# Patient Record
Sex: FEMALE | Race: BLACK OR AFRICAN AMERICAN | Employment: OTHER | URBAN - METROPOLITAN AREA
[De-identification: names, ages, dates, MRNs, and addresses within clinical notes are randomized per-mention and may not be internally consistent; named-entity substitution may affect disease eponyms.]

---

## 2023-08-12 ENCOUNTER — APPOINTMENT (OUTPATIENT)
Dept: CT IMAGING | Age: 78
DRG: 563 | End: 2023-08-12
Payer: MEDICARE

## 2023-08-12 ENCOUNTER — APPOINTMENT (OUTPATIENT)
Dept: GENERAL RADIOLOGY | Age: 78
DRG: 563 | End: 2023-08-12
Payer: MEDICARE

## 2023-08-12 ENCOUNTER — HOSPITAL ENCOUNTER (INPATIENT)
Age: 78
LOS: 2 days | Discharge: NURSING, REHAB OR LONG-TERM CARE | DRG: 563 | End: 2023-08-14
Attending: EMERGENCY MEDICINE | Admitting: INTERNAL MEDICINE
Payer: MEDICARE
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
## 2023-08-12 DIAGNOSIS — S32.591A CLOSED FRACTURE OF MULTIPLE RAMI OF RIGHT PUBIS, INITIAL ENCOUNTER (HCC): ICD-10-CM

## 2023-08-12 DIAGNOSIS — S62.101A RIGHT WRIST FRACTURE, CLOSED, INITIAL ENCOUNTER: ICD-10-CM
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
## 2023-08-12 PROBLEM — D64.9 NORMOCYTIC ANEMIA: Status: ACTIVE | Noted: 2023-08-12

## 2023-08-12 PROBLEM — G40.909 SEIZURE DISORDER (HCC): Status: ACTIVE | Noted: 2023-08-12

## 2023-08-12 PROBLEM — S52.551A CLOSED EXTRAARTICULAR FRACTURE OF DISTAL RADIUS, RIGHT, INITIAL ENCOUNTER: Status: ACTIVE | Noted: 2023-08-12

## 2023-08-12 LAB
ANION GAP SERPL CALCULATED.3IONS-SCNC: 15 MMOL/L (ref 3–16)
BASOPHILS # BLD: 0.1 K/UL (ref 0–0.2)
BASOPHILS NFR BLD: 0.7 %
BUN SERPL-MCNC: 18 MG/DL (ref 7–20)
CALCIUM SERPL-MCNC: 9.3 MG/DL (ref 8.3–10.6)
CHLORIDE SERPL-SCNC: 98 MMOL/L (ref 99–110)
CO2 SERPL-SCNC: 23 MMOL/L (ref 21–32)
CREAT SERPL-MCNC: 0.7 MG/DL (ref 0.6–1.2)
DEPRECATED RDW RBC AUTO: 15.1 % (ref 12.4–15.4)
EOSINOPHIL # BLD: 0 K/UL (ref 0–0.6)
EOSINOPHIL NFR BLD: 0.2 %
GFR SERPLBLD CREATININE-BSD FMLA CKD-EPI: >60 ML/MIN/{1.73_M2}
GLUCOSE SERPL-MCNC: 74 MG/DL (ref 70–99)
HCT VFR BLD AUTO: 33.5 % (ref 36–48)
HGB BLD-MCNC: 11.1 G/DL (ref 12–16)
LYMPHOCYTES # BLD: 1.4 K/UL (ref 1–5.1)
LYMPHOCYTES NFR BLD: 12.6 %
MCH RBC QN AUTO: 26.4 PG (ref 26–34)
MCHC RBC AUTO-ENTMCNC: 33.3 G/DL (ref 31–36)
MCV RBC AUTO: 79.2 FL (ref 80–100)
MONOCYTES # BLD: 0.6 K/UL (ref 0–1.3)
MONOCYTES NFR BLD: 5.4 %
NEUTROPHILS # BLD: 8.8 K/UL (ref 1.7–7.7)
NEUTROPHILS NFR BLD: 81.1 %
PLATELET # BLD AUTO: 191 K/UL (ref 135–450)
PMV BLD AUTO: 10.2 FL (ref 5–10.5)
POTASSIUM SERPL-SCNC: 4.4 MMOL/L (ref 3.5–5.1)
RBC # BLD AUTO: 4.23 M/UL (ref 4–5.2)
SODIUM SERPL-SCNC: 136 MMOL/L (ref 136–145)
WBC # BLD AUTO: 10.8 K/UL (ref 4–11)

## 2023-08-12 PROCEDURE — 80048 BASIC METABOLIC PNL TOTAL CA: CPT

## 2023-08-12 PROCEDURE — 6370000000 HC RX 637 (ALT 250 FOR IP): Performed by: INTERNAL MEDICINE

## 2023-08-12 PROCEDURE — 85025 COMPLETE CBC W/AUTO DIFF WBC: CPT

## 2023-08-12 PROCEDURE — 96374 THER/PROPH/DIAG INJ IV PUSH: CPT

## 2023-08-12 PROCEDURE — 1200000000 HC SEMI PRIVATE

## 2023-08-12 PROCEDURE — 73502 X-RAY EXAM HIP UNI 2-3 VIEWS: CPT

## 2023-08-12 PROCEDURE — 6360000002 HC RX W HCPCS: Performed by: PHYSICIAN ASSISTANT

## 2023-08-12 PROCEDURE — 2580000003 HC RX 258: Performed by: INTERNAL MEDICINE

## 2023-08-12 PROCEDURE — 73110 X-RAY EXAM OF WRIST: CPT

## 2023-08-12 PROCEDURE — 96375 TX/PRO/DX INJ NEW DRUG ADDON: CPT

## 2023-08-12 PROCEDURE — 72192 CT PELVIS W/O DYE: CPT

## 2023-08-12 PROCEDURE — 6370000000 HC RX 637 (ALT 250 FOR IP): Performed by: PHYSICIAN ASSISTANT

## 2023-08-12 PROCEDURE — 99285 EMERGENCY DEPT VISIT HI MDM: CPT

## 2023-08-12 RX ORDER — SODIUM CHLORIDE 0.9 % (FLUSH) 0.9 %
5-40 SYRINGE (ML) INJECTION EVERY 12 HOURS SCHEDULED
Status: DISCONTINUED | OUTPATIENT
Start: 2023-08-12 | End: 2023-08-14 | Stop reason: HOSPADM

## 2023-08-12 RX ORDER — MORPHINE SULFATE 4 MG/ML
4 INJECTION INTRAVENOUS ONCE
Status: COMPLETED | OUTPATIENT
Start: 2023-08-12 | End: 2023-08-12

## 2023-08-12 RX ORDER — ACETAMINOPHEN 325 MG/1
650 TABLET ORAL EVERY 6 HOURS PRN
Status: DISCONTINUED | OUTPATIENT
Start: 2023-08-12 | End: 2023-08-14 | Stop reason: HOSPADM

## 2023-08-12 RX ORDER — ACETAMINOPHEN 650 MG/1
650 SUPPOSITORY RECTAL EVERY 6 HOURS PRN
Status: DISCONTINUED | OUTPATIENT
Start: 2023-08-12 | End: 2023-08-14 | Stop reason: HOSPADM

## 2023-08-12 RX ORDER — ONDANSETRON 2 MG/ML
4 INJECTION INTRAMUSCULAR; INTRAVENOUS ONCE
Status: COMPLETED | OUTPATIENT
Start: 2023-08-12 | End: 2023-08-12

## 2023-08-12 RX ORDER — TORSEMIDE 20 MG/1
20 TABLET ORAL DAILY
COMMUNITY
Start: 2023-08-02

## 2023-08-12 RX ORDER — CHOLECALCIFEROL (VITAMIN D3) 1250 MCG
1 CAPSULE ORAL
COMMUNITY

## 2023-08-12 RX ORDER — ACETAMINOPHEN 500 MG
500 TABLET ORAL EVERY 6 HOURS PRN
COMMUNITY

## 2023-08-12 RX ORDER — PRIMIDONE 250 MG/1
250 TABLET ORAL DAILY
COMMUNITY
Start: 2023-07-19

## 2023-08-12 RX ORDER — CALCIUM CARBONATE 500 MG/1
1 TABLET, CHEWABLE ORAL DAILY
COMMUNITY

## 2023-08-12 RX ORDER — SODIUM CHLORIDE 9 MG/ML
INJECTION, SOLUTION INTRAVENOUS PRN
Status: DISCONTINUED | OUTPATIENT
Start: 2023-08-12 | End: 2023-08-14 | Stop reason: HOSPADM

## 2023-08-12 RX ORDER — ONDANSETRON 2 MG/ML
4 INJECTION INTRAMUSCULAR; INTRAVENOUS EVERY 6 HOURS PRN
Status: DISCONTINUED | OUTPATIENT
Start: 2023-08-12 | End: 2023-08-14 | Stop reason: HOSPADM

## 2023-08-12 RX ORDER — FERROUS SULFATE 325(65) MG
325 TABLET ORAL
COMMUNITY

## 2023-08-12 RX ORDER — OXYCODONE HYDROCHLORIDE 5 MG/1
5 TABLET ORAL ONCE
Status: COMPLETED | OUTPATIENT
Start: 2023-08-12 | End: 2023-08-12

## 2023-08-12 RX ORDER — MAGNESIUM HYDROXIDE/ALUMINUM HYDROXICE/SIMETHICONE 120; 1200; 1200 MG/30ML; MG/30ML; MG/30ML
30 SUSPENSION ORAL EVERY 6 HOURS PRN
Status: DISCONTINUED | OUTPATIENT
Start: 2023-08-12 | End: 2023-08-14 | Stop reason: HOSPADM

## 2023-08-12 RX ORDER — ONDANSETRON 4 MG/1
4 TABLET, ORALLY DISINTEGRATING ORAL EVERY 8 HOURS PRN
Status: DISCONTINUED | OUTPATIENT
Start: 2023-08-12 | End: 2023-08-14 | Stop reason: HOSPADM

## 2023-08-12 RX ORDER — ENOXAPARIN SODIUM 100 MG/ML
40 INJECTION SUBCUTANEOUS DAILY
Status: DISCONTINUED | OUTPATIENT
Start: 2023-08-13 | End: 2023-08-14 | Stop reason: HOSPADM

## 2023-08-12 RX ORDER — POLYETHYLENE GLYCOL 3350 17 G/17G
17 POWDER, FOR SOLUTION ORAL DAILY PRN
Status: DISCONTINUED | OUTPATIENT
Start: 2023-08-12 | End: 2023-08-14 | Stop reason: HOSPADM

## 2023-08-12 RX ORDER — SODIUM CHLORIDE 0.9 % (FLUSH) 0.9 %
5-40 SYRINGE (ML) INJECTION PRN
Status: DISCONTINUED | OUTPATIENT
Start: 2023-08-12 | End: 2023-08-14 | Stop reason: HOSPADM

## 2023-08-12 RX ORDER — CAMPHOR, MENTHOL, METHYL SALICYLATE 21.56; 41.73; 69.55 MG/1; MG/1; MG/1
PATCH PERCUTANEOUS; TOPICAL; TRANSDERMAL 2 TIMES DAILY
COMMUNITY

## 2023-08-12 RX ORDER — CARBAMAZEPINE 200 MG/1
200 TABLET ORAL 3 TIMES DAILY
COMMUNITY
Start: 2023-08-02

## 2023-08-12 RX ORDER — M-VIT,TX,IRON,MINS/CALC/FOLIC 27MG-0.4MG
1 TABLET ORAL DAILY
COMMUNITY

## 2023-08-12 RX ADMIN — OXYCODONE HYDROCHLORIDE 5 MG: 5 TABLET ORAL at 21:01

## 2023-08-12 RX ADMIN — ONDANSETRON 4 MG: 2 INJECTION INTRAMUSCULAR; INTRAVENOUS at 16:04

## 2023-08-12 RX ADMIN — ACETAMINOPHEN 650 MG: 325 TABLET ORAL at 22:46

## 2023-08-12 RX ADMIN — SODIUM CHLORIDE, PRESERVATIVE FREE 10 ML: 5 INJECTION INTRAVENOUS at 22:39

## 2023-08-12 RX ADMIN — MORPHINE SULFATE 4 MG: 4 INJECTION INTRAVENOUS at 16:03

## 2023-08-12 ASSESSMENT — PAIN DESCRIPTION - LOCATION
LOCATION: HIP

## 2023-08-12 ASSESSMENT — ENCOUNTER SYMPTOMS
ABDOMINAL PAIN: 0
DIARRHEA: 0
COUGH: 0
WHEEZING: 0
VOMITING: 0
GASTROINTESTINAL NEGATIVE: 1
NAUSEA: 0
EYES NEGATIVE: 1
FACIAL SWELLING: 0
SORE THROAT: 0
TROUBLE SWALLOWING: 0
CHEST TIGHTNESS: 0
RESPIRATORY NEGATIVE: 1
SHORTNESS OF BREATH: 0
BACK PAIN: 0

## 2023-08-12 ASSESSMENT — PAIN SCALES - GENERAL
PAINLEVEL_OUTOF10: 0
PAINLEVEL_OUTOF10: 8
PAINLEVEL_OUTOF10: 0
PAINLEVEL_OUTOF10: 10
PAINLEVEL_OUTOF10: 7
PAINLEVEL_OUTOF10: 10

## 2023-08-12 ASSESSMENT — PAIN DESCRIPTION - ORIENTATION
ORIENTATION: RIGHT
ORIENTATION: RIGHT;LEFT
ORIENTATION: RIGHT

## 2023-08-12 ASSESSMENT — PAIN DESCRIPTION - FREQUENCY: FREQUENCY: CONTINUOUS

## 2023-08-12 ASSESSMENT — PAIN - FUNCTIONAL ASSESSMENT: PAIN_FUNCTIONAL_ASSESSMENT: PREVENTS OR INTERFERES WITH ALL ACTIVE AND SOME PASSIVE ACTIVITIES

## 2023-08-12 ASSESSMENT — PAIN DESCRIPTION - DESCRIPTORS
DESCRIPTORS: ACHING;DISCOMFORT
DESCRIPTORS: DISCOMFORT

## 2023-08-12 ASSESSMENT — PAIN DESCRIPTION - PAIN TYPE: TYPE: ACUTE PAIN

## 2023-08-12 ASSESSMENT — LIFESTYLE VARIABLES: HOW OFTEN DO YOU HAVE A DRINK CONTAINING ALCOHOL: NEVER

## 2023-08-12 ASSESSMENT — PAIN DESCRIPTION - ONSET: ONSET: ON-GOING

## 2023-08-12 NOTE — ED TRIAGE NOTES
[facility]. Mobile xray shows possible non-displaced fx to right pelvis.   VSS

## 2023-08-12 NOTE — ED PROVIDER NOTES
ED Attending Attestation Note     Date of evaluation: 2/23/2023    This patient was seen by the advance practice provider. I have seen and examined the patient, agree with the workup, evaluation, management and diagnosis. The care plan has been discussed. Patient History     Chief Complaint: Hip Pain (Right hip pain)      HPI: Wilmer Lin is a 68 y.o. who presents to the Emergency Department with complaints of pain in her right hip and pelvis after a slip and fall out of bed at her long-term care facility. An x-ray was done at the facility earlier today, which was concerning for possible pelvic fracture, for which reason she was transferred to the emergency department for further evaluation. Patient resides at the skilled nursing facility, but states that she is typically able to walk with a walker, but has not been able to bear significant weight since the injury. She has no past medical history on file. She has no past surgical history on file. family history is not on file. She reports that she has never smoked. She does not have any smokeless tobacco history on file. She reports that she does not drink alcohol and does not use drugs. Pertinent Physical Exam Findings:   Somewhat frail-appearing elderly patient, in some discomfort, but no acute distress. She does not have any deformity or shortening of the right lower extremity, without significant pain on logroll, but does have tenderness over the area of the hip and pelvis. Plain films show no evidence of fracture, but CT does show fractures of the superior and inferior pubic rami. Initial plan was for likely discharge back to her facility, as these injuries are weightbearing as tolerated. However, over the course the patient's emergency department stay she complained more of right wrist pain, and was found to have tenderness in that area. Plain films show a distal radius fracture, which was splinted.   She will therefore require

## 2023-08-12 NOTE — PROGRESS NOTES
Patient Name: Martin Ortega  : 1945  Phone: [phone]    PCP: Willa Mccauley MD    [facility]    Contacted for low energy minimally displaced right LC1 pelvic ring injury in a 68 YOF    She may be WBAT with walker    These fractures tend to heal predictably with conservative management unless there is pelvic ring instability    Follow up in clinic in 1-2 weeks for repeat pelvic x ray, inlet and outlet views, to confirm fracture stability        KYARA Knott MD  [facility]  Office: [phone]  Cell: [phone]    23  6:51 PM

## 2023-08-13 ENCOUNTER — APPOINTMENT (OUTPATIENT)
Dept: CT IMAGING | Age: 78
DRG: 563 | End: 2023-08-13
Payer: MEDICARE

## 2023-08-13 ENCOUNTER — APPOINTMENT (OUTPATIENT)
Dept: GENERAL RADIOLOGY | Age: 78
DRG: 563 | End: 2023-08-13
Payer: MEDICARE

## 2023-08-13 LAB
ANION GAP SERPL CALCULATED.3IONS-SCNC: 9 MMOL/L (ref 3–16)
BACTERIA URNS QL MICRO: ABNORMAL /HPF
BASOPHILS # BLD: 0.1 K/UL (ref 0–0.2)
BASOPHILS NFR BLD: 0.8 %
BILIRUB UR QL STRIP.AUTO: NEGATIVE
BUN SERPL-MCNC: 20 MG/DL (ref 7–20)
CALCIUM SERPL-MCNC: 8.9 MG/DL (ref 8.3–10.6)
CHLORIDE SERPL-SCNC: 102 MMOL/L (ref 99–110)
CLARITY UR: CLEAR
CO2 SERPL-SCNC: 27 MMOL/L (ref 21–32)
COLOR UR: YELLOW
CREAT SERPL-MCNC: 0.9 MG/DL (ref 0.6–1.2)
DEPRECATED RDW RBC AUTO: 15 % (ref 12.4–15.4)
EOSINOPHIL # BLD: 0.1 K/UL (ref 0–0.6)
EOSINOPHIL NFR BLD: 1.8 %
EPI CELLS #/AREA URNS HPF: ABNORMAL /HPF (ref 0–5)
GFR SERPLBLD CREATININE-BSD FMLA CKD-EPI: >60 ML/MIN/{1.73_M2}
GLUCOSE SERPL-MCNC: 107 MG/DL (ref 70–99)
GLUCOSE UR STRIP.AUTO-MCNC: NEGATIVE MG/DL
HCT VFR BLD AUTO: 30.9 % (ref 36–48)
HGB BLD-MCNC: 10.1 G/DL (ref 12–16)
HGB UR QL STRIP.AUTO: ABNORMAL
KETONES UR STRIP.AUTO-MCNC: ABNORMAL MG/DL
LEUKOCYTE ESTERASE UR QL STRIP.AUTO: ABNORMAL
LYMPHOCYTES # BLD: 1.1 K/UL (ref 1–5.1)
LYMPHOCYTES NFR BLD: 13.8 %
MCH RBC QN AUTO: 25.9 PG (ref 26–34)
MCHC RBC AUTO-ENTMCNC: 32.7 G/DL (ref 31–36)
MCV RBC AUTO: 79 FL (ref 80–100)
MONOCYTES # BLD: 0.7 K/UL (ref 0–1.3)
MONOCYTES NFR BLD: 8.2 %
NEUTROPHILS # BLD: 6.2 K/UL (ref 1.7–7.7)
NEUTROPHILS NFR BLD: 75.4 %
NITRITE UR QL STRIP.AUTO: NEGATIVE
PH UR STRIP.AUTO: 6 [PH] (ref 5–8)
PLATELET # BLD AUTO: 147 K/UL (ref 135–450)
PMV BLD AUTO: 8.9 FL (ref 5–10.5)
POTASSIUM SERPL-SCNC: 4.9 MMOL/L (ref 3.5–5.1)
PROT UR STRIP.AUTO-MCNC: 100 MG/DL
RBC # BLD AUTO: 3.91 M/UL (ref 4–5.2)
RBC #/AREA URNS HPF: ABNORMAL /HPF (ref 0–4)
SODIUM SERPL-SCNC: 138 MMOL/L (ref 136–145)
SP GR UR STRIP.AUTO: 1.02 (ref 1–1.03)
UA COMPLETE W REFLEX CULTURE PNL UR: YES
UA DIPSTICK W REFLEX MICRO PNL UR: YES
URN SPEC COLLECT METH UR: ABNORMAL
UROBILINOGEN UR STRIP-ACNC: 0.2 E.U./DL
WBC # BLD AUTO: 8.2 K/UL (ref 4–11)
WBC #/AREA URNS HPF: >100 /HPF (ref 0–5)

## 2023-08-13 PROCEDURE — 6370000000 HC RX 637 (ALT 250 FOR IP): Performed by: INTERNAL MEDICINE

## 2023-08-13 PROCEDURE — 6360000002 HC RX W HCPCS: Performed by: INTERNAL MEDICINE

## 2023-08-13 PROCEDURE — 6370000000 HC RX 637 (ALT 250 FOR IP): Performed by: NURSE PRACTITIONER

## 2023-08-13 PROCEDURE — 85025 COMPLETE CBC W/AUTO DIFF WBC: CPT

## 2023-08-13 PROCEDURE — 72170 X-RAY EXAM OF PELVIS: CPT

## 2023-08-13 PROCEDURE — 87086 URINE CULTURE/COLONY COUNT: CPT

## 2023-08-13 PROCEDURE — 73200 CT UPPER EXTREMITY W/O DYE: CPT

## 2023-08-13 PROCEDURE — 87088 URINE BACTERIA CULTURE: CPT

## 2023-08-13 PROCEDURE — 6360000002 HC RX W HCPCS: Performed by: NURSE PRACTITIONER

## 2023-08-13 PROCEDURE — 87186 SC STD MICRODIL/AGAR DIL: CPT

## 2023-08-13 PROCEDURE — 81001 URINALYSIS AUTO W/SCOPE: CPT

## 2023-08-13 PROCEDURE — 36415 COLL VENOUS BLD VENIPUNCTURE: CPT

## 2023-08-13 PROCEDURE — 1200000000 HC SEMI PRIVATE

## 2023-08-13 PROCEDURE — 80048 BASIC METABOLIC PNL TOTAL CA: CPT

## 2023-08-13 PROCEDURE — 2580000003 HC RX 258: Performed by: INTERNAL MEDICINE

## 2023-08-13 RX ORDER — PRIMIDONE 250 MG/1
250 TABLET ORAL DAILY
Status: DISCONTINUED | OUTPATIENT
Start: 2023-08-13 | End: 2023-08-14 | Stop reason: HOSPADM

## 2023-08-13 RX ORDER — KETOROLAC TROMETHAMINE 30 MG/ML
15 INJECTION, SOLUTION INTRAMUSCULAR; INTRAVENOUS EVERY 6 HOURS PRN
Status: DISCONTINUED | OUTPATIENT
Start: 2023-08-13 | End: 2023-08-14 | Stop reason: HOSPADM

## 2023-08-13 RX ORDER — CARBAMAZEPINE 200 MG/1
200 TABLET ORAL 3 TIMES DAILY
Status: DISCONTINUED | OUTPATIENT
Start: 2023-08-13 | End: 2023-08-14 | Stop reason: HOSPADM

## 2023-08-13 RX ORDER — FERROUS SULFATE 325(65) MG
325 TABLET ORAL
Status: DISCONTINUED | OUTPATIENT
Start: 2023-08-14 | End: 2023-08-14 | Stop reason: HOSPADM

## 2023-08-13 RX ORDER — OXYCODONE HYDROCHLORIDE 5 MG/1
5 TABLET ORAL EVERY 4 HOURS PRN
Status: DISCONTINUED | OUTPATIENT
Start: 2023-08-13 | End: 2023-08-14 | Stop reason: HOSPADM

## 2023-08-13 RX ORDER — METHOCARBAMOL 500 MG/1
500 TABLET, FILM COATED ORAL 4 TIMES DAILY PRN
Status: DISCONTINUED | OUTPATIENT
Start: 2023-08-13 | End: 2023-08-14 | Stop reason: HOSPADM

## 2023-08-13 RX ADMIN — SODIUM CHLORIDE, PRESERVATIVE FREE 10 ML: 5 INJECTION INTRAVENOUS at 20:23

## 2023-08-13 RX ADMIN — ENOXAPARIN SODIUM 40 MG: 100 INJECTION SUBCUTANEOUS at 09:33

## 2023-08-13 RX ADMIN — CARBAMAZEPINE 200 MG: 200 TABLET ORAL at 14:45

## 2023-08-13 RX ADMIN — KETOROLAC TROMETHAMINE 15 MG: 30 INJECTION, SOLUTION INTRAMUSCULAR; INTRAVENOUS at 16:33

## 2023-08-13 RX ADMIN — ACETAMINOPHEN 650 MG: 325 TABLET ORAL at 09:33

## 2023-08-13 RX ADMIN — PRIMIDONE 250 MG: 250 TABLET ORAL at 11:57

## 2023-08-13 RX ADMIN — OXYCODONE HYDROCHLORIDE 5 MG: 5 TABLET ORAL at 05:46

## 2023-08-13 RX ADMIN — KETOROLAC TROMETHAMINE 15 MG: 30 INJECTION, SOLUTION INTRAMUSCULAR; INTRAVENOUS at 04:07

## 2023-08-13 RX ADMIN — CARBAMAZEPINE 200 MG: 200 TABLET ORAL at 20:22

## 2023-08-13 RX ADMIN — METHOCARBAMOL 500 MG: 500 TABLET ORAL at 02:09

## 2023-08-13 RX ADMIN — OXYCODONE HYDROCHLORIDE 5 MG: 5 TABLET ORAL at 01:13

## 2023-08-13 RX ADMIN — METHOCARBAMOL 500 MG: 500 TABLET ORAL at 09:33

## 2023-08-13 ASSESSMENT — PAIN - FUNCTIONAL ASSESSMENT
PAIN_FUNCTIONAL_ASSESSMENT: PREVENTS OR INTERFERES SOME ACTIVE ACTIVITIES AND ADLS
PAIN_FUNCTIONAL_ASSESSMENT: PREVENTS OR INTERFERES WITH ALL ACTIVE AND SOME PASSIVE ACTIVITIES

## 2023-08-13 ASSESSMENT — ENCOUNTER SYMPTOMS
ALLERGIC/IMMUNOLOGIC NEGATIVE: 1
RESPIRATORY NEGATIVE: 1
EYES NEGATIVE: 1
GASTROINTESTINAL NEGATIVE: 1

## 2023-08-13 ASSESSMENT — PAIN DESCRIPTION - DESCRIPTORS
DESCRIPTORS: ACHING;DISCOMFORT;SHARP
DESCRIPTORS: ACHING;DISCOMFORT
DESCRIPTORS: SHOOTING
DESCRIPTORS: ACHING;DISCOMFORT

## 2023-08-13 ASSESSMENT — PAIN SCALES - GENERAL
PAINLEVEL_OUTOF10: 0
PAINLEVEL_OUTOF10: 7
PAINLEVEL_OUTOF10: 0
PAINLEVEL_OUTOF10: 9
PAINLEVEL_OUTOF10: 6
PAINLEVEL_OUTOF10: 9
PAINLEVEL_OUTOF10: 8
PAINLEVEL_OUTOF10: 9

## 2023-08-13 ASSESSMENT — PAIN DESCRIPTION - FREQUENCY
FREQUENCY: INTERMITTENT
FREQUENCY: INTERMITTENT
FREQUENCY: CONTINUOUS
FREQUENCY: INTERMITTENT

## 2023-08-13 ASSESSMENT — PAIN DESCRIPTION - ONSET
ONSET: AWAKENED FROM SLEEP
ONSET: AWAKENED FROM SLEEP
ONSET: ON-GOING
ONSET: AWAKENED FROM SLEEP

## 2023-08-13 ASSESSMENT — PAIN DESCRIPTION - PAIN TYPE
TYPE: ACUTE PAIN

## 2023-08-13 ASSESSMENT — PAIN DESCRIPTION - LOCATION
LOCATION: HIP

## 2023-08-13 ASSESSMENT — PAIN DESCRIPTION - ORIENTATION
ORIENTATION: RIGHT
ORIENTATION: RIGHT;LEFT

## 2023-08-13 NOTE — PLAN OF CARE
Problem: Skin/Tissue Integrity  Goal: Absence of new skin breakdown  Description: 1. Monitor for areas of redness and/or skin breakdown  2. Assess vascular access sites hourly  3. Every 4-6 hours minimum:  Change oxygen saturation probe site  4. Every 4-6 hours:  If on nasal continuous positive airway pressure, respiratory therapy assess nares and determine need for appliance change or resting period.   8/13/2023 0827 by Lou Cruz RN  Outcome: Progressing  8/12/2023 2323 by Harmony Reich RN  Outcome: Progressing     Problem: ABCDS Injury Assessment  Goal: Absence of physical injury  Outcome: Progressing     Problem: Safety - Adult  Goal: Free from fall injury  8/13/2023 0827 by Lou Cruz RN  Outcome: Progressing  8/12/2023 2323 by Harmony Reich RN  Outcome: Progressing     Problem: Discharge Planning  Goal: Discharge to home or other facility with appropriate resources  8/12/2023 2323 by Harmony Reich RN  Outcome: Progressing     Problem: Pain  Goal: Verbalizes/displays adequate comfort level or baseline comfort level  8/12/2023 2323 by Harmony Reich RN  Outcome: Progressing

## 2023-08-13 NOTE — H&P
V2.0  History and Physical      Name:  Rosaline Goltz /Age/Sex: 1945  (79 y.o. female)   MRN & CSN:  [id number] & [id number] Encounter Date/Time: 2023 10:46 PM EDT   Location:  [facility]- PCP: Paz Wright [address] [place] [address] Day: 1    Assessment and Plan:   Rosaline Goltz is a 68 y.o. female with a pmh of seizure disorder who presents with nonsyncopal Ground-level fall. Hospital Problems             Last Modified POA    * (Principal) Ground-level fall 2023 Yes    Closed extraarticular fracture of distal radius, right, initial encounter 2023 Yes    Closed fracture of multiple pubic rami, right, initial encounter ([address]) 2023 Yes    Seizure disorder ([address]) 2023 Yes    Normocytic anemia 2023 Yes       Plan:  Pain management  Check UA  Continue home regimen for chronic stable conditions    Disposition:   Current Living situation: Long-term care  Expected Disposition: SNF  Estimated D/C: 2 to 3 days    Diet ADULT DIET; Regular; No Added Salt (3-4 gm)   DVT Prophylaxis [x] Lovenox, []  Heparin, [] SCDs, [] Ambulation,  [] Eliquis, [] Xarelto, [] Coumadin   Code Status Full Code   Surrogate Decision Maker/ TONIA Alvarado     Personally reviewed Lab Studies and Imaging     Discussed management of the case with no one who recommended n/a. EKG interpreted personally and results n/a. Imaging that was interpreted personally includes right wrist and hip x-rays and results mildly displaced right distal radius fracture, no fracture or dislocation, respectively. Drugs that require monitoring for toxicity include none and the method of monitoring was n/a. History from:     patient    History of Present Illness:     Chief Complaint: Low back pain after fall  Rosaline Goltz is a 68 y.o. female with pmh of vitamin D deficiency, seizure disorder, frequent falls who presents with low back pain after a nonsyncopal ground-level fall.   Patient got up to go the bathroom and had a for: LABA1C  TSH: No results found for: TSH  Troponin: No results found for: TROPONINT  Lactic Acid: No results for input(s): LACTA in the last 72 hours. BNP: No results for input(s): PROBNP in the last 72 hours. UA:No results found for: Durene Alejandro, PHUR, LABCAST, WBCUA, RBCUA, MUCUS, TRICHOMONAS, YEAST, BACTERIA, CLARITYU, SPECGRAV, LEUKOCYTESUR, UROBILINOGEN, BILIRUBINUR, BLOODU, GLUCOSEU, KETUA, AMORPHOUS  Urine Cultures: No results found for: LABURIN  Blood Cultures: No results found for: BC  No results found for: BLOODCULT2  Organism: No results found for: ORG    Imaging/Diagnostics Last 24 Hours   XR WRIST RIGHT (MIN 3 VIEWS)    Result Date: 8/12/2023  XR WRIST RIGHT (MIN 3 VIEWS) History: pain Comparison: None available Findings: Mildly displaced fracture of the distal radius extending to the radiocarpal surface. Mild volar angulation of the distal fragment. Soft tissue swelling about the joint. No radiopaque foreign body. Atherosclerotic vascular calcifications. Impression: Mildly displaced distal radius fracture. Electronically signed by Brendan Pitt MD    CT PELVIS WO CONTRAST Additional Contrast? None    Result Date: 8/12/2023  CT PELVIS WO CONTRAST INDICATION: pain, fall, COMPARISON: None. TECHNIQUE: CT of the pelvis was performed without contrast according to standard protocol. Up-to-date CT equipment and radiation dose reduction techniques were employed. IV Contrast: None. FINDINGS: Minimally displaced fracture of the right superior pubic ramus extending to the pubic symphysis. Nondisplaced fracture of the right inferior pubic ramus. The sacrum and coccyx appear intact. There is no widening or displacement of the sacroiliac joints. There is good anatomical alignment of the femoral heads within the acetabula bilaterally. Visualized soft tissues of the pelvis are unremarkable. Moderate amount of inspissated rectal and colonic stool.      1.  Minimally displaced fracture of the right superior pubic

## 2023-08-13 NOTE — ED NOTES
ED TO INPATIENT SBAR HANDOFF    Patient Name: Cristy Phillips   :  1945  68 y.o. MRN:  [id number]  Preferred Name  Cristy Phillips   ED Room #:  R33/T79-83  Family/Caregiver Present no   Restraints no   Sitter no   Sepsis Risk Score Sepsis Risk Score: 1.92    Situation  Code Status: Full Code No additional code details. Allergies: Patient has no known allergies. Weight: Patient Vitals for the past 96 hrs (Last 3 readings):   Weight   23 1522 148 lb 3.2 oz (67.2 kg)     Arrived from: nursing home  Chief Complaint:   Chief Complaint   Patient presents with    Hip Pain     Right hip pain     Hospital Problem/Diagnosis:  Principal Problem:    Ground-level fall  Resolved Problems:    * No resolved hospital problems. *    Imaging:   XR WRIST RIGHT (MIN 3 VIEWS)   Final Result   Impression:    Mildly displaced distal radius fracture. Electronically signed by Yair Avila MD      CT PELVIS WO CONTRAST Additional Contrast? None   Final Result      1. Minimally displaced fracture of the right superior pubic ramus extending to the pubic symphysis. 2.  Nondisplaced fracture of the right inferior pubic ramus. 3.  Moderate amount of inspissated rectal and colonic stool. Electronically signed by Yair Avila MD      XR HIP 2-3 VW W PELVIS RIGHT   Final Result   Impression:   No displaced fracture.       Electronically signed by Yair Avila MD        Abnormal labs:   Abnormal Labs Reviewed   CBC WITH AUTO DIFFERENTIAL - Abnormal; Notable for the following components:       Result Value    Hemoglobin 11.1 (*)     Hematocrit 33.5 (*)     MCV 79.2 (*)     Neutrophils Absolute 8.8 (*)     All other components within normal limits   BASIC METABOLIC PANEL W/ REFLEX TO MG FOR LOW K - Abnormal; Notable for the following components:    Chloride 98 (*)     All other components within normal limits     Critical values: no     Abnormal Assessment Findings: None     Background  History:   Past Medical History: Miko Rolon RN  08/12/23 5772

## 2023-08-13 NOTE — CONSULTS
Kwabena Stover MD  [place] Office: [address] [place] [address] (7680) [place]      Patient Name: Sydni Wong  : 1945  Phone: [phone]    PCP: Tarun White MD    [facility]  Surgery Date:     Inpatient consult to Orthopedic Surgery  Consult performed by: Ruperto Fuller MD  Consult ordered by: GER Guillaume  Reason for consult: Pelvic fracture  Assessment/Recommendations:   1. Right LC1 pelvic ring injury  2. Right intra-articular distal radius fracture    Her pelvic ring injury should heal predictably with nonoperative treatment. I recommend mobilizing with weightbearing as tolerated on the right leg. Use walker  and have her mobilize with physical therapy    For the right wrist, this intra-articular injury may warrant intervention. I have ordered a CT scan to better evaluate the degree of displacement and articular involvement. No surgery planned for today. Okay for diet. Okay to mobilize with PT. Right upper extremity may bear weight through the elbow with a platform walker  Weightbearing as tolerated right lower extremity    Further recommendations on the wrist pending CT scan results. Theoretically if surgery is indicated this could be performed on an outpatient basis as well. Kwabena Stover MD  [facility]  [phone]      Chief Complaint:  Right hip and right wrist pain    HPI:  68 y.o. female who presents after a fall onto her right hip when she slipped out of bed last night. I was called last night regarding pelvis fracture, however the patient also reports that she has right wrist pain and was found to have a wrist fracture subsequently. She is at baseline and ambulated with a walker and lives in a nursing facility.   She denies any preceding symptoms    She denies numbness, tingling, fevers, chills, chest pain, shortness of breath or any other acute symptoms      Past Medical History:   Diagnosis Date    Anemia

## 2023-08-13 NOTE — PROGRESS NOTES
4 Eyes Skin Assessment     NAME:  Leatrice Babinski  YOB: 1945  MEDICAL RECORD NUMBER:  [id number]    The patient is being assessed for  Admission    I agree that at least one RN has performed a thorough Head to Toe Skin Assessment on the patient. ALL assessment sites listed below have been assessed. Areas assessed by both nurses:    Head, Face, Ears, Shoulders, Back, Chest, Arms, Elbows, Hands, Sacrum. Buttock, Coccyx, Ischium, Legs. Feet and Heels, Under Medical Devices , and Other          Does the Patient have a Wound?  No noted wound(s)       Zack Prevention initiated by RN: Yes  Wound Care Orders initiated by RN: No    Pressure Injury (Stage 3,4, Unstageable, DTI, NWPT, and Complex wounds) if present, place Wound referral order by RN under : No    New Ostomies, if present place, Ostomy referral order under : No     Nurse 1 eSignature: Electronically signed by Jarvis Moise RN on 8/12/23 at 10:49 PM EDT    **SHARE this note so that the co-signing nurse can place an eSignature**    Nurse 2 eSignature: Electronically signed by Jamie Jane RN on 8/12/23 at 11:10 PM EDT
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
## 2023-08-13 NOTE — PROGRESS NOTES
V2.0    [facility] Progress Note      Name:  Rudi Lawrence /Age/Sex: 1945  (79 y.o. female)   MRN & CSN:  [id number] & [id number] Encounter Date/Time: 2023 10:01 AM EDT   Location:  [address] [place] [address] PCP: Wesly Deluca MD     [address] [place] [address] Day: 2    Assessment and Recommendations   Rudi Lawrence is a 68 y.o. female who presented to the emergency room on  with complaints of right hip pain after fall. Patient states she slipped and fell out of bed. Her work-up demonstrated minimally displaced right suprapubic ramus fracture, nondisplaced fracture of the right inferior pubic ramus, and mildly displaced right distal radius fracture. Plan:   Fall-resulting in inferior right pubic ramus and right suprapubic ramus fractures and right distal radius fracture  Seizure disorder-continue Tegretol  Tremor-continued home primidone  Iron deficiency anemia-    Patient with right pelvic fractures and right distal radius fracture. Await PT OT. Per Ortho patient can be full weightbearing the bilateral lower extremities. Diet ADULT DIET; Regular; No Added Salt (3-4 gm)   DVT Prophylaxis [] Lovenox, []  Heparin, [] SCDs, [] Ambulation,  [] Eliquis, [] Xarelto  [] Coumadin   Code Status Full Code   Disposition From: Nursing home  Expected Disposition: Nursing home  Estimated Date of Discharge: 1 to 2 days  Patient requires continued admission due to fractures   Surrogate Decision Maker/ POA  Per EMR     Personally reviewed Lab Studies and Imaging     BMP-no significant abnormality  CBC-hemoglobin 11.1  Urinalysis with reflex to culture-ordered but not completed. Imaging that was interpreted personally includes x-ray of the right wrist and results demonstrate mildly displaced distal radius fracture. Also reviewed the CT scan which demonstrated right pelvic fractures which are nondisplaced        Subjective:     Chief Complaint: Right leg pain    Rudi Lawrence is a 68 y.o.

## 2023-08-13 NOTE — PROGRESS NOTES
As noted the patient was admitted overnight from the emergency department. She is okay with physical therapy  Weightbearing as tolerated with walker  No surgical intervention indicated    I will see the patient later today on morning rounds if she is still here, however if otherwise ready, discharge should not be delayed for inpatient orthopedic consultation in this patient      KYARA Snowden MD  [facility]  [phone]

## 2023-08-13 NOTE — PROGRESS NOTES
Patient admitted to 3311. Alert and oriented x 4. Vitals stable, afebrile. C/o mild bilateral hip pain, medicated with tylenol. Admission and 4 eye skin assessment complete. Call light and plan of care reviewed. Fall precautions initiated. Bed alarm activated, low position, wheels locked. Awaiting PT/OT eval.  Call light and belongings within reach.

## 2023-08-13 NOTE — CARE COORDINATION
Update    rcv'd call back from [address] [place] [address] in admission  [phone]  who  confirms  pt  is  LTC and can return once  medically cleared. Electronically signed by Tyler Joseph RN on 8/13/2023 at 3:01 PM  ++++++++++++++++++++++++++++++++++++++++++++++++++++++      Case Management Assessment  Initial Evaluation    Date/Time of Evaluation: 8/13/2023 11:14 AM  Assessment Completed by: Tyler Joseph RN    If patient is discharged prior to next notation, then this note serves as note for discharge by case management. Patient Name: Claudia Anand                   YOB: 1945  Diagnosis: Fall from bed, initial encounter [W06. XXXA]  Right wrist fracture, closed, initial encounter [S62.101A]  Closed fracture of multiple rami of right pubis, initial encounter ([address] ) [S32.591A]  Ground-level fall [facility]                   Date / Time: 8/12/2023  3:13 PM    Patient Admission Status: Inpatient   Readmission Risk (Low < 19, Mod (19-27), High > 27): Readmission Risk Score: 10.3    Current PCP: Dedrick Reid MD  PCP verified by CM? Yes    Chart Reviewed: Yes      History Provided by: Patient  Patient Orientation: Alert and Oriented    Patient Cognition: Alert    Hospitalization in the last 30 days (Readmission):  No    If yes, Readmission Assessment in  Navigator will be completed.     Advance Directives:      Code Status: Full Code   Patient's Primary Decision Maker is: Legal Next of Kin      Discharge Planning:    Patient lives with: Alone Type of Home: Skilled Nursing Facility  Primary Care Giver: Self  Patient Support Systems include: Family Members   Current Financial resources: Medicare, Medicaid  Current community resources: ECF/Home Care  Current services prior to admission: Durable Medical Equipment            Current DME: Wheelchair, Walker            Type of Home Care services:  None    ADLS  Prior functional level: Assistance with the following:  Current functional level: Assistance with the

## 2023-08-13 NOTE — PLAN OF CARE
[facility] Hospitalist brief note  Consult received. Case reviewed with ER physician  68 yp f in after fall out of bed with R distal radius, inf. and sup. pubic rami fxs. Full note to follow.     Litzy Landrum MD    Thanks  Traci Cheatham MD

## 2023-08-14 VITALS
HEIGHT: 66 IN | HEART RATE: 88 BPM | BODY MASS INDEX: 22.18 KG/M2 | OXYGEN SATURATION: 96 % | RESPIRATION RATE: 16 BRPM | DIASTOLIC BLOOD PRESSURE: 65 MMHG | SYSTOLIC BLOOD PRESSURE: 124 MMHG | WEIGHT: 138.01 LBS | TEMPERATURE: 99 F

## 2023-08-14 PROCEDURE — 2580000003 HC RX 258: Performed by: INTERNAL MEDICINE

## 2023-08-14 PROCEDURE — 97162 PT EVAL MOD COMPLEX 30 MIN: CPT

## 2023-08-14 PROCEDURE — 2580000003 HC RX 258: Performed by: NURSE PRACTITIONER

## 2023-08-14 PROCEDURE — 6360000002 HC RX W HCPCS: Performed by: NURSE PRACTITIONER

## 2023-08-14 PROCEDURE — 6370000000 HC RX 637 (ALT 250 FOR IP): Performed by: NURSE PRACTITIONER

## 2023-08-14 PROCEDURE — 97166 OT EVAL MOD COMPLEX 45 MIN: CPT

## 2023-08-14 PROCEDURE — 6360000002 HC RX W HCPCS: Performed by: INTERNAL MEDICINE

## 2023-08-14 PROCEDURE — 97535 SELF CARE MNGMENT TRAINING: CPT

## 2023-08-14 PROCEDURE — 97530 THERAPEUTIC ACTIVITIES: CPT

## 2023-08-14 RX ORDER — METHOCARBAMOL 500 MG/1
500 TABLET, FILM COATED ORAL 4 TIMES DAILY PRN
Qty: 20 TABLET | Refills: 0 | DISCHARGE
Start: 2023-08-14 | End: 2023-08-24

## 2023-08-14 RX ORDER — OXYCODONE HYDROCHLORIDE 5 MG/1
5 TABLET ORAL EVERY 6 HOURS PRN
Qty: 12 TABLET | Refills: 0 | Status: SHIPPED | OUTPATIENT
Start: 2023-08-14 | End: 2023-08-17

## 2023-08-14 RX ORDER — ENOXAPARIN SODIUM 100 MG/ML
40 INJECTION SUBCUTANEOUS DAILY
Qty: 45 EACH | Refills: 0 | DISCHARGE
Start: 2023-08-15 | End: 2023-09-29

## 2023-08-14 RX ORDER — SULFAMETHOXAZOLE AND TRIMETHOPRIM 800; 160 MG/1; MG/1
1 TABLET ORAL 2 TIMES DAILY
Qty: 14 TABLET | Refills: 0 | DISCHARGE
Start: 2023-08-14 | End: 2023-08-21

## 2023-08-14 RX ADMIN — CEFTRIAXONE 1000 MG: 1 INJECTION, POWDER, FOR SOLUTION INTRAMUSCULAR; INTRAVENOUS at 10:05

## 2023-08-14 RX ADMIN — ENOXAPARIN SODIUM 40 MG: 100 INJECTION SUBCUTANEOUS at 09:00

## 2023-08-14 RX ADMIN — METHOCARBAMOL 500 MG: 500 TABLET ORAL at 08:59

## 2023-08-14 RX ADMIN — KETOROLAC TROMETHAMINE 15 MG: 30 INJECTION, SOLUTION INTRAMUSCULAR; INTRAVENOUS at 07:09

## 2023-08-14 RX ADMIN — OXYCODONE HYDROCHLORIDE 5 MG: 5 TABLET ORAL at 08:59

## 2023-08-14 RX ADMIN — FERROUS SULFATE TAB 325 MG (65 MG ELEMENTAL FE) 325 MG: 325 (65 FE) TAB at 08:59

## 2023-08-14 RX ADMIN — CARBAMAZEPINE 200 MG: 200 TABLET ORAL at 09:00

## 2023-08-14 RX ADMIN — SODIUM CHLORIDE: 9 INJECTION, SOLUTION INTRAVENOUS at 10:02

## 2023-08-14 RX ADMIN — SODIUM CHLORIDE, PRESERVATIVE FREE 10 ML: 5 INJECTION INTRAVENOUS at 10:05

## 2023-08-14 RX ADMIN — PRIMIDONE 250 MG: 250 TABLET ORAL at 08:59

## 2023-08-14 ASSESSMENT — PAIN DESCRIPTION - ONSET: ONSET: GRADUAL

## 2023-08-14 ASSESSMENT — PAIN - FUNCTIONAL ASSESSMENT: PAIN_FUNCTIONAL_ASSESSMENT: PREVENTS OR INTERFERES WITH MANY ACTIVE NOT PASSIVE ACTIVITIES

## 2023-08-14 ASSESSMENT — PAIN SCALES - GENERAL
PAINLEVEL_OUTOF10: 9
PAINLEVEL_OUTOF10: 8
PAINLEVEL_OUTOF10: 8

## 2023-08-14 ASSESSMENT — PAIN DESCRIPTION - DESCRIPTORS
DESCRIPTORS: SPASM
DESCRIPTORS: SPASM
DESCRIPTORS: ACHING;DISCOMFORT;SHOOTING

## 2023-08-14 ASSESSMENT — PAIN DESCRIPTION - PAIN TYPE: TYPE: ACUTE PAIN

## 2023-08-14 ASSESSMENT — PAIN DESCRIPTION - FREQUENCY: FREQUENCY: INTERMITTENT

## 2023-08-14 ASSESSMENT — PAIN DESCRIPTION - ORIENTATION: ORIENTATION: RIGHT

## 2023-08-14 ASSESSMENT — PAIN DESCRIPTION - LOCATION
LOCATION: HIP
LOCATION: PELVIS
LOCATION: PELVIS

## 2023-08-14 NOTE — PROGRESS NOTES
Physical Therapy  Facility/Department: [facility]  Physical Therapy Initial Assessment and Treatment    Name: Wilmer Lin  : 1945  MRN: [id number]  Date of Service: 2023    Discharge Recommendations:    Wilmer Lin scored a 8/24 on the AM-PAC short mobility form. Current research shows that an AM-PAC score of 17 or less is typically not associated with a discharge to the patient's home setting. Based on the patient's AM-PAC score and their current functional mobility deficits, it is recommended that the patient have 3-5 sessions per week of Physical Therapy at d/c to increase the patient's independence. Please see assessment section for further patient specific details. If patient discharges prior to next session this note will serve as a discharge summary. Please see below for the latest assessment towards goals. PT Equipment Recommendations  Equipment Needed:  (defer to next LOC)      Patient Diagnosis(es): The primary encounter diagnosis was Fall from bed, initial encounter. Diagnoses of Right wrist fracture, closed, initial encounter and Closed fracture of multiple rami of right pubis, initial encounter [facility]) were also pertinent to this visit. Past Medical History:  has a past medical history of Anemia, Frequent falls, Seizure disorder ([address]), and Vitamin D deficiency. Past Surgical History:  has no past surgical history on file. Assessment   Assessment: Pt lives at [address] and normally ambulates with RW and performs ADL with assist.  Currently needs max assist for sit to stand and is limited by NWB RUE as well as pain with RLE movement and WB. Plan is for return to LTC with therapies as indicated to maximize mobility and safety  Treatment Diagnosis: Decreased functional mobility post fall with RUE/ R pelvic fx.   Barriers to Learning: cognition/ pain  Activity Tolerance  Activity Tolerance: Patient tolerated evaluation without incident;Patient limited by pain;Treatment limited secondary to decreased cognition     Plan   Physcial Therapy Plan  General Plan: 5-7 times per week  Current Treatment Recommendations: Strengthening, ROM, Functional mobility training, Gait training, Transfer training, Safety education & training, Pain management  Safety Devices  Type of Devices: Chair alarm in place, Nurse notified, Call light within reach, Left in chair     Restrictions  Position Activity Restriction  Other position/activity restrictions: up with assist,  Right upper extremity may bear weight through the elbow with a platform walker  Weightbearing as tolerated right lower extremity     Subjective   General  Additional Pertinent Hx: Adm 8/12 post fall from bed with Right distal radius fx., closed fracture of multiple rami of right pubis. Referring Practitioner: Orlando Greene  Diagnosis: Post fall with right distal radius fx and right pubic fx. Subjective  Subjective: Pt in bed, writhing occasionally due to right leg  \"Oh it hurts\"  RN notes IV meds given earlier,  given oral meds prior to PT. Social/Functional History  Social/Functional History  Lives With: Other (comment) ([address])  Type of Home: Facility  Home Layout: One level  Home Access: Level entry  Bathroom Toilet: Handicap height  Receives Help From: Other (comment) (facility)  ADL Assistance:  (Pt reports normally sponge bathing in room, toileting in room - is taken to shower room for showers.)  Homemaking Responsibilities: No  Ambulation Assistance: Independent (with RW)  Transfer Assistance: Independent  Active : No  Vision/Hearing  Vision  Vision: Within Functional Limits  Hearing  Hearing: Within functional limits    Cognition   Orientation  Overall Orientation Status: Impaired  Orientation Level: Oriented to place;Oriented to time;Oriented to person;Disoriented to situation  Cognition  Overall Cognitive Status: Exceptions  Arousal/Alertness: Appropriate responses to stimuli  Following Commands:  Follows one step

## 2023-08-14 NOTE — PROGRESS NOTES
Occupational Therapy  Facility/Department: [facility] Initial Assessment    Name: Providence Susana  : 1945  MRN: [id number]  Date of Service: 2023    Discharge Recommendations:Jeanna Mena scored a  on the AM-PAC ADL Inpatient form. Current research shows that an AM-PAC score of 17 or less is typically not associated with a discharge to the patient's home setting. Based on the patient's AM-PAC score and their current ADL deficits, it is recommended that the patient have 3-5 sessions per week of Occupational Therapy at d/c to increase the patient's independence. Please see assessment section for further patient specific details. If patient discharges prior to next session this note will serve as a discharge summary. Please see below for the latest assessment towards goals. OT Equipment Recommendations  Equipment Needed: No       Patient Diagnosis(es): The primary encounter diagnosis was Fall from bed, initial encounter. Diagnoses of Right wrist fracture, closed, initial encounter and Closed fracture of multiple rami of right pubis, initial encounter [facility]) were also pertinent to this visit. Past Medical History:  has a past medical history of Anemia, Frequent falls, Seizure disorder ([address]), and Vitamin D deficiency. Past Surgical History:  has no past surgical history on file. Treatment Diagnosis: impaired mobility, transfers, ADL      Assessment   Performance deficits / Impairments: Decreased functional mobility ; Decreased ADL status; Decreased endurance;Decreased ROM; Decreased strength  Assessment: From LTC admit with fall, pelvis fx, and R wrist fx. Pt got to chair via [facility] earlier this date with physical therapy, declined getting up from chair at this time. Agreeable to ADLs in chair. Completing UB bathing, dressing, grooming routine. Pt requiring assist with ADLs. Pt would benefit from cont therapies at MI. Rec rtn to LTC with OT. Cont POC.   Treatment Diagnosis: impaired mobility, transfers, ADL  Decision Making: Medium Complexity  REQUIRES OT FOLLOW-UP: Yes  Activity Tolerance  Activity Tolerance: Patient Tolerated treatment well;Patient limited by fatigue        Plan   Occupational Therapy Plan  Times Per Week: 2-5     Restrictions  Position Activity Restriction  Other position/activity restrictions: up with assist,  Right upper extremity may bear weight through the elbow with a platform walker  Weightbearing as tolerated right lower extremity    Subjective   General  Chart Reviewed: Yes  Additional Pertinent Hx: 68 y.o. female who presented to the emergency room on August 12 with complaints of right hip pain after fall. Patient states she slipped and fell out of bed. Her work-up demonstrated minimally displaced right suprapubic ramus fracture, nondisplaced fracture of the right inferior pubic ramus, and mildly displaced right distal radius fracture. Referring Practitioner: Ella Tsai MD  Diagnosis: fall  Subjective  Subjective: In chair agreeable to session reporting no pain at rest but pain in R hip with movement     Social/Functional History  Social/Functional History  Lives With: Other (comment) ([address])  Type of Home: Facility  Home Layout: One level  Home Access: Level entry  Bathroom Toilet: Handicap height  Receives Help From: Other (comment) (facility)  ADL Assistance:  (Pt reports normally sponge bathing in room, toileting in room - is taken to shower room for showers.)  Homemaking Responsibilities: No  Ambulation Assistance: Independent (with RW)  Transfer Assistance: Independent  Active : No       Objective             Observation/Palpation  Observation: Right forarm/wrist casted  Safety Devices  Type of Devices: Chair alarm in place;Nurse notified;Call light within reach; Left in chair  Bed Mobility Training  Bed Mobility Training: No  Balance  Sitting: Intact  Standing:  (declined OOB at this time- YVAN peoples with PT

## 2023-08-14 NOTE — PROGRESS NOTES
CT scan of the wrist reviewed and overall has good articular congruity. There is no large step-offs. This is acceptable alignment for 68year-old low demand female. We will plan to continue with nonoperative management. Nonweightbearing through the wrist but okay to bear weight through the elbow. Follow-up within 1 week for repeat x-rays in my clinic  [phone] to schedule    J.  Charito Petersen MD  [facility]  [phone]

## 2023-08-14 NOTE — DISCHARGE SUMMARY
V2.0  Discharge Summary    Name:  Glenn Owens /Age/Sex: 1945 (89 y.o. female)   Admit Date: 2023  Discharge Date: 23    MRN & CSN:  [id number] & [id number] Encounter Date and Time 23 11:41 AM EDT    Attending:  Orestes Hatfield MD Discharging Provider: ADILENE Shankar [facility] Course:     Brief HPI: Glenn Owens is a 68 y.o. female who presented to the emergency room on  with complaints of right hip pain after fall. Patient states she slipped and fell out of bed. Her work-up demonstrated minimally displaced right suprapubic ramus fracture, nondisplaced fracture of the right inferior pubic ramus, and mildly displaced right distal radius fracture. Brief Problem Based Course:   Fall- resulting in inferior right pubic ramus and right suprapubic ramus fracture, and right distal radius fracture. Orthopedic surgery was consulted. Patient may bear weight as tolerated on the bilateral lower extremities. She may bear weight to the elbow on her right upper extremity but mostly nonweightbearing to the wrist  Seizure disorder-continue Tegretol  Tremor-continue home primidone  UTI-urinalysis consistent with UTI. Patient was given 1 dose of Rocephin while inpatient and discharged on oral antibiotics  Iron-deficiency anemia-patient's oral iron was continued      The patient expressed appropriate understanding of, and agreement with the discharge recommendations, medications, and plan. Consults this admission:  IP CONSULT TO ORTHOPEDIC SURGERY    Discharge Diagnosis:   Ground-level fall        Discharge Instruction:   Follow up appointments: Dr Janene Saravia in 1 week  Primary care physician: Mary Darden MD within 2 weeks  Diet: regular diet   Activity: activity as tolerated, right UE   Nonweightbearing through the wrist but okay to bear weight through the elbow.  Right LE WBAT  Disposition: Discharged to:   []Home, []HHC, [x]SNF, []Acute Rehab, []Hospice

## 2023-08-14 NOTE — PROGRESS NOTES
Physician Progress Note      PATIENT:               Alvaro Solano  CSN #:                  [id number]  :                       1945  ADMIT DATE:       2023 3:13 PM  DISCH DATE:  RESPONDING  PROVIDER #:        Lisa CASTRO          QUERY TEXT:    Pt admitted  with inferior right pubic ramus and right suprapubic ramus   fracture, and right distal radius fracture. Pt noted to have UTI in discharge   summary. If possible, please document in progress notes and discharge summary   the present on admission status of UTI:    The medical record reflects the following:  Risk Factors: 77 y.o.f. Clinical Indicators:  UA  & cx obtained (Pending). UA: Large Leuk Est, Neg   Nitrites.  Attending \"Urinalysis with reflex to culture-ordered but not   completed\". Discharge sum \"UTI-urinalysis consistent with UTI. Patient was   given 1 dose of Rocephin while inpatient and discharged on oral antibiotics\". Treatment: Rocephin , UA/ CX   Options provided:  -- Yes, UTI was present at the time of the order to admit to the hospital  -- No, UTI was not present on admission and developed during the inpatient   stay  -- Other - I will add my own diagnosis  -- Disagree - Not applicable / Not valid  -- Disagree - Clinically unable to determine / Unknown  -- Refer to Clinical Documentation Reviewer    PROVIDER RESPONSE TEXT:    Yes, UTI was present at the time of the order to admit to the hospital.    Query created by:  Rasta Bush on 2023 3:29 PM      Electronically signed by:  Jacquelyn Garcia 2023 3:31 PM

## 2023-08-14 NOTE — PROGRESS NOTES
Pt discharged to [facility] via [facility]. Pt sent with after visit summary, continuity of care form, and paper prescription for oxycodone. PIV removed with no complications. Report called to Laura Siddiqi RN. All questions answered.

## 2023-08-14 NOTE — PROGRESS NOTES
Alert and oriented x 4. Vitals stable, afebrile. Denies pain at this time. Lungs clear, RA.  R arm with ace wrap, elevated with pillow support. Purewick replaced. UA with reflex to culture collected and sent to lab. Fall precautions in place. Bed alarm activated. Call light and belongings within reach. Patient encouraged to call nurse with needs and concerns. Frequent rounding to assess safety.   Awaiting PT/OT eval.

## 2023-08-14 NOTE — CARE COORDINATION
Case Management Assessment            Discharge Note                    Date / Time of Note: 8/14/2023 2:26 PM                  Discharge Note Completed by: GIL Mauricoi    Patient Name: Harmony Castro   YOB: 1945  Diagnosis: Fall from bed, initial encounter [W06. XXXA]  Right wrist fracture, closed, initial encounter [S62.101A]  Closed fracture of multiple rami of right pubis, initial encounter ([address] ) [S32.591A]  Ground-level fall [place]   Date / Time: 8/12/2023  3:13 PM    Current PCP: Nicole Severino MD  Clinic patient: No    Hospitalization in the last 30 days: No       Advance Directives:  Code Status: Full Code  [place] DNR form completed and on chart: Not Indicated    Financial:  Payor: Ewelina Gonsalves / Plan: Bina Rand / Product Type: *No Product type* /      Pharmacy:    [address] [place] [id number]  Phone: [phone] Fax: [phone]      Assistance purchasing medications?: Potential Assistance Purchasing Medications: No  Assistance provided by Case Management: None at this time    Does patient want to participate in local refill/ meds to beds program?: No    Meds To Beds General Rules:  1. Can ONLY be done Monday- Friday between 8:30am-5pm  2. Prescription(s) must be in pharmacy by 3pm to be filled same day  3. Copy of patient's insurance/ prescription drug card and patient face sheet must be sent along with the prescription(s)  4. Cost of Rx cannot be added to hospital bill. If financial assistance is needed, please contact unit  or ;  or  CANNOT provide pharmacy voucher for patients co-pays  5.  Patients can then  the prescription on their way out of the hospital at discharge, or pharmacy can deliver to the bedside if staff is available. (payment due at time of pick-up or delivery - cash, check, or card Yes    Care Transitions patient: No    GIL Mauricio  [facility]  Case Management Department  Ph: [phone]  Fax: [phone]

## 2023-08-14 NOTE — PLAN OF CARE
Problem: Skin/Tissue Integrity  Goal: Absence of new skin breakdown  Description: 1. Monitor for areas of redness and/or skin breakdown  2. Assess vascular access sites hourly  3. Every 4-6 hours minimum:  Change oxygen saturation probe site  4. Every 4-6 hours:  If on nasal continuous positive airway pressure, respiratory therapy assess nares and determine need for appliance change or resting period. Outcome: Progressing   Skin intact. Patient repositioned q 2 hours to prevent skin breakdown. Problem: Safety - Adult  Goal: Free from fall injury  Outcome: Progressing  Fall precautions in place. Bed alarm activated, low position, wheels locked. Awaiting PT/OT eval.      Problem: Discharge Planning  Goal: Discharge to home or other facility with appropriate resources  Outcome: Progressing   Case management following for discharge. Problem: Pain  Goal: Verbalizes/displays adequate comfort level or baseline comfort level  Outcome: Progressing   Currently denies pain unless repositioned. PRN pain medication available. Will monitor.

## 2023-08-16 LAB
BACTERIA UR CULT: ABNORMAL
ORGANISM: ABNORMAL

## 2023-08-21 NOTE — PROGRESS NOTES
Physician Progress Note      PATIENT:               Rodriguez Perkins  CSN #:                  [id number]  :                       1945  ADMIT DATE:       2023 3:13 PM  [facility] DATE:        2023 2:59 PM  RESPONDING  PROVIDER #:        Ede Gavin MD          QUERY TEXT:    Pt admitted  with inferior right pubic ramus and right suprapubic ramus   fracture, and right distal radius fracture after falling out of bed. Pt noted   to meet WHO criteria for osteoporosis on DEXA scan 2019. If possible, please   document if you are evaluating and/or treating any of the following: The medical record reflects the following:  Risk Factors: 68 y.o.f. w/ osteoporosis based on DEXA  Clinical Indicators: \"presents after a fall onto her right hip when she   slipped out of bed last night. \" 2019 DEXA: OSTEOPOROSIS. T-scores fulfill WHO   criteria for osteoporosis. T-score for the lumbar spine: -3.2; T-score for the   total hip: 1.5  Treatment: Ortho c/s, XR, CT, Home Vit D & MVI  Options provided:  -- Osteoporotic fracture of right pubic ramus and right radius following fall   which would not usually break a normal, healthy bone  -- Traumatic right pubic ramus and right radius fracture  -- Other - I will add my own diagnosis  -- Disagree - Not applicable / Not valid  -- Disagree - Clinically unable to determine / Unknown  -- Refer to Clinical Documentation Reviewer    PROVIDER RESPONSE TEXT:    This patient has an osteoporotic right pubic ramus and right radius fracture   following fall which would not break a normal, healthy bone. Query created by:  Chidi Arvizu on 8/15/2023 7:48 AM      Electronically signed by:  Ede Gavin MD 2023 4:06 PM

## 2025-02-22 ENCOUNTER — HOSPITAL ENCOUNTER (EMERGENCY)
Age: 80
Discharge: HOME | End: 2025-02-22
Attending: STUDENT IN AN ORGANIZED HEALTH CARE EDUCATION/TRAINING PROGRAM
Payer: COMMERCIAL

## 2025-02-22 ENCOUNTER — APPOINTMENT (OUTPATIENT)
Dept: GENERAL RADIOLOGY | Age: 80
End: 2025-02-22
Payer: COMMERCIAL

## 2025-02-22 ENCOUNTER — APPOINTMENT (OUTPATIENT)
Dept: CT IMAGING | Age: 80
End: 2025-02-22
Payer: COMMERCIAL

## 2025-02-22 VITALS
OXYGEN SATURATION: 99 % | BODY MASS INDEX: 24.05 KG/M2 | RESPIRATION RATE: 16 BRPM | TEMPERATURE: 98.3 F | HEART RATE: 75 BPM | SYSTOLIC BLOOD PRESSURE: 124 MMHG | WEIGHT: 149 LBS | DIASTOLIC BLOOD PRESSURE: 65 MMHG

## 2025-02-22 DIAGNOSIS — R55 SYNCOPE AND COLLAPSE: Primary | ICD-10-CM

## 2025-02-22 LAB
ANION GAP SERPL CALCULATED.3IONS-SCNC: 11 MMOL/L (ref 3–16)
BASOPHILS # BLD: 0 K/UL (ref 0–0.2)
BASOPHILS NFR BLD: 0.8 %
BUN SERPL-MCNC: 14 MG/DL (ref 7–20)
CALCIUM SERPL-MCNC: 8.8 MG/DL (ref 8.3–10.6)
CHLORIDE SERPL-SCNC: 102 MMOL/L (ref 99–110)
CO2 SERPL-SCNC: 24 MMOL/L (ref 21–32)
CREAT SERPL-MCNC: 0.8 MG/DL (ref 0.6–1.2)
DEPRECATED RDW RBC AUTO: 14.9 % (ref 12.4–15.4)
EKG ATRIAL RATE: 75 BPM
EKG DIAGNOSIS: NORMAL
EKG P AXIS: 42 DEGREES
EKG P-R INTERVAL: 160 MS
EKG Q-T INTERVAL: 374 MS
EKG QRS DURATION: 92 MS
EKG QTC CALCULATION (BAZETT): 417 MS
EKG R AXIS: -71 DEGREES
EKG T AXIS: 42 DEGREES
EKG VENTRICULAR RATE: 75 BPM
EOSINOPHIL # BLD: 0.1 K/UL (ref 0–0.6)
EOSINOPHIL NFR BLD: 1.1 %
GFR SERPLBLD CREATININE-BSD FMLA CKD-EPI: 75 ML/MIN/{1.73_M2}
GLUCOSE BLD-MCNC: 128 MG/DL (ref 70–99)
GLUCOSE SERPL-MCNC: 124 MG/DL (ref 70–99)
HCT VFR BLD AUTO: 32.8 % (ref 36–48)
HGB BLD-MCNC: 10.6 G/DL (ref 12–16)
LYMPHOCYTES # BLD: 0.8 K/UL (ref 1–5.1)
LYMPHOCYTES NFR BLD: 14.5 %
MCH RBC QN AUTO: 26 PG (ref 26–34)
MCHC RBC AUTO-ENTMCNC: 32.4 G/DL (ref 31–36)
MCV RBC AUTO: 80.2 FL (ref 80–100)
MONOCYTES # BLD: 0.6 K/UL (ref 0–1.3)
MONOCYTES NFR BLD: 11.5 %
NEUTROPHILS # BLD: 4 K/UL (ref 1.7–7.7)
NEUTROPHILS NFR BLD: 72.1 %
PERFORMED ON: ABNORMAL
PLATELET # BLD AUTO: 183 K/UL (ref 135–450)
PMV BLD AUTO: 8.8 FL (ref 5–10.5)
POTASSIUM SERPL-SCNC: 3.6 MMOL/L (ref 3.5–5.1)
RBC # BLD AUTO: 4.09 M/UL (ref 4–5.2)
SODIUM SERPL-SCNC: 137 MMOL/L (ref 136–145)
TROPONIN, HIGH SENSITIVITY: 12 NG/L (ref 0–14)
TROPONIN, HIGH SENSITIVITY: 15 NG/L (ref 0–14)
WBC # BLD AUTO: 5.5 K/UL (ref 4–11)

## 2025-02-22 PROCEDURE — 80048 BASIC METABOLIC PNL TOTAL CA: CPT

## 2025-02-22 PROCEDURE — 99285 EMERGENCY DEPT VISIT HI MDM: CPT

## 2025-02-22 PROCEDURE — 85025 COMPLETE CBC W/AUTO DIFF WBC: CPT

## 2025-02-22 PROCEDURE — 71045 X-RAY EXAM CHEST 1 VIEW: CPT

## 2025-02-22 PROCEDURE — 70450 CT HEAD/BRAIN W/O DYE: CPT

## 2025-02-22 PROCEDURE — 84484 ASSAY OF TROPONIN QUANT: CPT

## 2025-02-22 PROCEDURE — 93005 ELECTROCARDIOGRAM TRACING: CPT | Performed by: STUDENT IN AN ORGANIZED HEALTH CARE EDUCATION/TRAINING PROGRAM

## 2025-02-22 NOTE — ED PROVIDER NOTES
THE [facility]  EMERGENCY DEPARTMENT ENCOUNTER          ATTENDING PHYSICIAN NOTE       Date of evaluation: 2/22/2025    Chief Complaint     Loss of Consciousness (Patient was sitting on the toilet having a BM when she had a syncopal episode. Patient is alert and oriented and denies concerns. )      History of Present Illness     Jeanna Franco is a 79 y.o. female who presents via EMS from [facility] after a reported syncopal episode.  Per report received by nurses, patient was having a BM on the toilet and had loss of consciousness.  Nursing home was not able to provide many other details.  They said she is \"at her baseline\" but were not able to explain what her baseline is.  Patient herself is not able to provide much history.  She knows her name and that she is at the hospital but when asked what happened or about any symptoms, she responds \"I don't know.\"  She is denying any pain currently.    I obtained collateral from her nursing home.  The nurse I spoke with says she is somewhat familiar with her but is not with her often.  She says the patient at baseline is able to have a conversation but is not typically oriented to the year.  The patient does not walk at baseline due to old fractures.  The nurse said that an aide witnessed the event today and was with her when it happened.  They received no reports of any injury or trauma to the head or seizure activity.    ASSESSMENT / PLAN  (MEDICAL DECISION MAKING)     INITIAL VITALS: BP: 111/60, Temp: 98.3 °F (36.8 °C), Pulse: 74, Respirations: 16, SpO2: 98 %      Jeanna Franco is a 79 y.o. female with history of anemia and seizures who presents from her nursing facility with a witnessed syncopal episode while having a bowel movement.  Patient is not able to describe much of what happened, says she does not remember and on arrival is mostly answering \"I do not know\" to questions.  I obtained collateral from the nursing home as demonstrated in the

## 2025-02-22 NOTE — DISCHARGE INSTRUCTIONS
Jeanna likely passed out from a vagal effect when she was having a bowel movement. Her workup in the ER was normal including labs, EKG and a scan of her head.  Please have her follow-up with her doctor in the next week for reevaluation.